# Patient Record
Sex: MALE | Race: BLACK OR AFRICAN AMERICAN | Employment: FULL TIME | URBAN - METROPOLITAN AREA
[De-identification: names, ages, dates, MRNs, and addresses within clinical notes are randomized per-mention and may not be internally consistent; named-entity substitution may affect disease eponyms.]

---

## 2017-09-13 ENCOUNTER — OFFICE VISIT (OUTPATIENT)
Dept: FAMILY MEDICINE CLINIC | Age: 37
End: 2017-09-13

## 2017-09-13 VITALS
RESPIRATION RATE: 18 BRPM | DIASTOLIC BLOOD PRESSURE: 69 MMHG | HEIGHT: 67 IN | WEIGHT: 140 LBS | HEART RATE: 65 BPM | SYSTOLIC BLOOD PRESSURE: 100 MMHG | OXYGEN SATURATION: 98 % | TEMPERATURE: 97.2 F | BODY MASS INDEX: 21.97 KG/M2

## 2017-09-13 DIAGNOSIS — L72.9 SKIN CYST: Primary | ICD-10-CM

## 2017-09-13 RX ORDER — SULFAMETHOXAZOLE AND TRIMETHOPRIM 800; 160 MG/1; MG/1
1 TABLET ORAL 2 TIMES DAILY
Qty: 20 TAB | Refills: 0 | Status: SHIPPED | OUTPATIENT
Start: 2017-09-13 | End: 2017-09-23

## 2017-09-13 NOTE — MR AVS SNAPSHOT
Visit Information Date & Time Provider Department Dept. Phone Encounter #  
 9/13/2017 11:30 AM Rosalino Villanueva [facility] [phone] [id number] Your Appointments 9/13/2017 11:30 AM  
ROUTINE CARE with Rosalino Villanueva, BESSY [facility] ([facility]) Appt Note: boils in different areas on body; vm cut me off; PT R/S FROM 08/31/2017  
 [facility] [address]  
[facility] [address] Upcoming Health Maintenance Date Due Pneumococcal 19-64 Medium Risk (1 of 1 - PPSV23) 10/14/1998 DTaP/Tdap/Td series (1 - Tdap) 10/14/2000 INFLUENZA AGE 9 TO ADULT 8/1/2017 Allergies as of 9/13/2017  Review Complete On: 9/13/2017 By: Ezekiel Marc LPN No Known Allergies Current Immunizations  Never Reviewed No immunizations on file. Not reviewed this visit Vitals BP Pulse Temp Resp Height(growth percentile) Weight(growth percentile) 100/69 (BP 1 Location: Right arm, BP Patient Position: Sitting) 65 97.2 °F (36.2 °C) (Oral) 18 5' 7\" (1.702 m) 140 lb (63.5 kg) SpO2 BMI Smoking Status 98% 21.93 kg/m2 Current Every Day Smoker BMI and BSA Data Body Mass Index Body Surface Area  
 21.93 kg/m 2 1.73 m 2 Your Updated Medication List  
  
Notice  As of 9/13/2017 11:25 AM  
 You have not been prescribed any medications. Introducing [facility]! Shanti Peacock introduces [email or web address] patient portal. Now you can access parts of your medical record, email your doctor's office, and request medication refills online. 1. In your internet browser, go to [email or web address] 2. Click on the First Time User? Click Here link in the Sign In box. You will see the New Member Sign Up page. 3. Enter your [email or web address] Access Code exactly as it appears below.  You will not need to use this code after youve completed the sign-up process. If you do not sign up before the expiration date, you must request a new code. · CareShare Access Code: [email or web address] Expires: 12/12/2017 11:25 AM 
 
4. Enter the last four digits of your Social Security Number (xxxx) and Date of Birth (mm/dd/yyyy) as indicated and click Submit. You will be taken to the next sign-up page. 5. Create a CareShare ID. This will be your CareShare login ID and cannot be changed, so think of one that is secure and easy to remember. 6. Create a CareShare password. You can change your password at any time. 7. Enter your Password Reset Question and Answer. This can be used at a later time if you forget your password. 8. Enter your e-mail address. You will receive e-mail notification when new information is available in [address] 9. Click Sign Up. You can now view and download portions of your medical record. 10. Click the Download Summary menu link to download a portable copy of your medical information. If you have questions, please visit the Frequently Asked Questions section of the CareShare website. Remember, CareShare is NOT to be used for urgent needs. For medical emergencies, dial 911. Now available from your iPhone and Android! Please provide this summary of care documentation to your next provider. Your primary care clinician is listed as Gato Do. If you have any questions after today's visit, please call [phone].

## 2017-09-13 NOTE — PROGRESS NOTES
HISTORY OF PRESENT ILLNESS  Danny Matias is a 39 y.o. male. 9/13/17  11:15 AM    Chief Complaint   Patient presents with    Skin Problem     patient states he have boils on buttocks  and legs  for about 1 mo. he also states the boils come and go. HPI: Here today for complaints of boils. PCP CRIS Damon PA-C. He reports that he gets these intermittent boils on his buttocks, upper thighs, and groin area- this episode occurring for the last month. Has several at this time today. Has not tried anything to help. No recent AB usage. No fevers or chills. Review of Systems   Constitutional: Negative for chills and fever. Skin: Negative for itching and rash.        +boils        PHQ Screening   PHQ over the last two weeks 9/13/2017   Little interest or pleasure in doing things -   Feeling down, depressed or hopeless Not at all   Total Score PHQ 2 -         History  History reviewed. No pertinent past medical history. History reviewed. No pertinent surgical history. Social History     Social History    Marital status: SINGLE     Spouse name: N/A    Number of children: N/A    Years of education: N/A     Occupational History    Not on file. Social History Main Topics    Smoking status: Current Every Day Smoker     Packs/day: 0.25     Years: 6.00    Smokeless tobacco: Current User      Comment: smoke 5-6 a day     Alcohol use 1.2 oz/week     2 Cans of beer per week    Drug use: No    Sexual activity: No     Other Topics Concern    Not on file     Social History Narrative       No Known Allergies      No daily medications      Patient Care Team:  Patient Care Team:  EMILY Welch as PCP - General        LABS:  None new to review    RADIOLOGY:  None new to review      Physical Exam   Constitutional: He is oriented to person, place, and time. He appears well-developed and well-nourished. No distress. Pulmonary/Chest: Effort normal. No respiratory distress.    Neurological: He is alert and oriented to person, place, and time. Skin: Skin is warm and dry.        -several resolving cysts noted to groin and buttocks; numerous areas of scarring to buttocks consistent with resolved cysts; no erythema noted and no areas of definite fluid accumulation        Vitals:    09/13/17 1101   BP: 100/69   Pulse: 65   Resp: 18   Temp: 97.2 °F (36.2 °C)   TempSrc: Oral   SpO2: 98%   Weight: 140 lb (63.5 kg)   Height: 5' 7\" (1.702 m)   PainSc:   5   PainLoc: Abdomen       ASSESSMENT and PLAN  Diagnoses and all orders for this visit:    Skin cyst  *Discussed with patient, he reports frequent cysts this last month. Will do AB therapy at this time. Advised on warm compresses and hygiene. Advised on low caffeine, considering Dial soap for antibacterial properties and avoidance of constricting clothing. *No cysts with palpable areas needing drained- I&D not performed. -     trimethoprim-sulfamethoxazole (BACTRIM DS, SEPTRA DS) 160-800 mg per tablet; Take 1 Tab by mouth two (2) times a day for 10 days. *Plan of care reviewed with patient. Patient in agreement with plan and expresses understanding. All questions answered and patient encouraged to call or RTO if further questions or concerns. Follow-up Disposition:  Return if symptoms worsen or fail to improve.

## 2018-05-21 ENCOUNTER — HOSPITAL ENCOUNTER (EMERGENCY)
Age: 38
Discharge: HOME | End: 2018-05-21
Attending: EMERGENCY MEDICINE
Payer: SELF-PAY

## 2018-05-21 ENCOUNTER — APPOINTMENT (OUTPATIENT)
Dept: GENERAL RADIOLOGY | Age: 38
End: 2018-05-21
Attending: EMERGENCY MEDICINE
Payer: SELF-PAY

## 2018-05-21 VITALS
HEIGHT: 67 IN | RESPIRATION RATE: 15 BRPM | TEMPERATURE: 98.8 F | BODY MASS INDEX: 21.97 KG/M2 | HEART RATE: 70 BPM | SYSTOLIC BLOOD PRESSURE: 135 MMHG | DIASTOLIC BLOOD PRESSURE: 76 MMHG | OXYGEN SATURATION: 100 % | WEIGHT: 140 LBS

## 2018-05-21 DIAGNOSIS — R07.89 OTHER CHEST PAIN: Primary | ICD-10-CM

## 2018-05-21 LAB
ALBUMIN SERPL-MCNC: 3.3 G/DL (ref 3.4–5)
ALBUMIN/GLOB SERPL: 1.1 {RATIO} (ref 0.8–1.7)
ALP SERPL-CCNC: 47 U/L (ref 45–117)
ALT SERPL-CCNC: 31 U/L (ref 16–61)
ANION GAP SERPL CALC-SCNC: 9 MMOL/L (ref 3–18)
AST SERPL-CCNC: 24 U/L (ref 15–37)
BASOPHILS # BLD: 0 K/UL (ref 0–0.1)
BASOPHILS NFR BLD: 0 % (ref 0–2)
BILIRUB DIRECT SERPL-MCNC: 0.2 MG/DL (ref 0–0.2)
BILIRUB SERPL-MCNC: 0.5 MG/DL (ref 0.2–1)
BUN SERPL-MCNC: 4 MG/DL (ref 7–18)
BUN/CREAT SERPL: 4 (ref 12–20)
CALCIUM SERPL-MCNC: 8.6 MG/DL (ref 8.5–10.1)
CHLORIDE SERPL-SCNC: 106 MMOL/L (ref 100–108)
CK MB CFR SERPL CALC: 0.8 % (ref 0–4)
CK MB CFR SERPL CALC: 0.8 % (ref 0–4)
CK MB SERPL-MCNC: 1.1 NG/ML (ref 5–25)
CK MB SERPL-MCNC: 1.4 NG/ML (ref 5–25)
CK SERPL-CCNC: 139 U/L (ref 39–308)
CK SERPL-CCNC: 179 U/L (ref 39–308)
CO2 SERPL-SCNC: 28 MMOL/L (ref 21–32)
CREAT SERPL-MCNC: 0.9 MG/DL (ref 0.6–1.3)
DIFFERENTIAL METHOD BLD: ABNORMAL
EOSINOPHIL # BLD: 0.1 K/UL (ref 0–0.4)
EOSINOPHIL NFR BLD: 1 % (ref 0–5)
ERYTHROCYTE [DISTWIDTH] IN BLOOD BY AUTOMATED COUNT: 13.5 % (ref 11.6–14.5)
GLOBULIN SER CALC-MCNC: 3 G/DL (ref 2–4)
GLUCOSE SERPL-MCNC: 93 MG/DL (ref 74–99)
HCT VFR BLD AUTO: 40.3 % (ref 36–48)
HGB BLD-MCNC: 15.6 G/DL (ref 13–16)
LIPASE SERPL-CCNC: 97 U/L (ref 73–393)
LYMPHOCYTES # BLD: 2.2 K/UL (ref 0.9–3.6)
LYMPHOCYTES NFR BLD: 29 % (ref 21–52)
MCH RBC QN AUTO: 30.2 PG (ref 24–34)
MCHC RBC AUTO-ENTMCNC: 38.1 G/DL (ref 31–37)
MCV RBC AUTO: 77.5 FL (ref 74–97)
MONOCYTES # BLD: 0.5 K/UL (ref 0.05–1.2)
MONOCYTES NFR BLD: 7 % (ref 3–10)
NEUTS SEG # BLD: 4.9 K/UL (ref 1.8–8)
NEUTS SEG NFR BLD: 63 % (ref 40–73)
PLATELET # BLD AUTO: 216 K/UL (ref 135–420)
PMV BLD AUTO: 9.2 FL (ref 9.2–11.8)
POTASSIUM SERPL-SCNC: 3.7 MMOL/L (ref 3.5–5.5)
PROT SERPL-MCNC: 6.3 G/DL (ref 6.4–8.2)
RBC # BLD AUTO: 5.2 M/UL (ref 4.7–5.5)
SODIUM SERPL-SCNC: 143 MMOL/L (ref 136–145)
TROPONIN I SERPL-MCNC: <0.02 NG/ML (ref 0–0.04)
TROPONIN I SERPL-MCNC: <0.02 NG/ML (ref 0–0.04)
WBC # BLD AUTO: 7.7 K/UL (ref 4.6–13.2)

## 2018-05-21 PROCEDURE — 94762 N-INVAS EAR/PLS OXIMTRY CONT: CPT

## 2018-05-21 PROCEDURE — 83690 ASSAY OF LIPASE: CPT | Performed by: EMERGENCY MEDICINE

## 2018-05-21 PROCEDURE — 85025 COMPLETE CBC W/AUTO DIFF WBC: CPT | Performed by: EMERGENCY MEDICINE

## 2018-05-21 PROCEDURE — 71045 X-RAY EXAM CHEST 1 VIEW: CPT

## 2018-05-21 PROCEDURE — 93005 ELECTROCARDIOGRAM TRACING: CPT

## 2018-05-21 PROCEDURE — 82550 ASSAY OF CK (CPK): CPT | Performed by: EMERGENCY MEDICINE

## 2018-05-21 PROCEDURE — 74011250637 HC RX REV CODE- 250/637: Performed by: EMERGENCY MEDICINE

## 2018-05-21 PROCEDURE — 99285 EMERGENCY DEPT VISIT HI MDM: CPT

## 2018-05-21 PROCEDURE — 80076 HEPATIC FUNCTION PANEL: CPT | Performed by: EMERGENCY MEDICINE

## 2018-05-21 PROCEDURE — 80048 BASIC METABOLIC PNL TOTAL CA: CPT | Performed by: EMERGENCY MEDICINE

## 2018-05-21 RX ORDER — GUAIFENESIN 100 MG/5ML
324 LIQUID (ML) ORAL
Status: COMPLETED | OUTPATIENT
Start: 2018-05-21 | End: 2018-05-21

## 2018-05-21 RX ADMIN — ASPIRIN 81 MG 324 MG: 81 TABLET ORAL at 17:24

## 2018-05-21 NOTE — ED PROVIDER NOTES
EMERGENCY DEPARTMENT HISTORY AND PHYSICAL EXAM    4:44 PM      Date: 2018  Patient Name: Nazario Baird    History of Presenting Illness     Chief Complaint   Patient presents with    Chest Pain         History Provided By: Patient    Additional History (Context): Nazario Baird is a 44 y.o. male with No significant past medical history presenting to the ED with c/o constant stabbing CP that began earlier this morning. Reports a family hx of cardiac disease, however denies having any hx of cardiac issue himself. Denies any SOB, diaphoresis, fever, chills, leg swelling, leg pain, abdominal pain, nausea, vomiting, diarrhea, urinary sx, long period of traveling or hx of blood clots. Severity is 10/10. No other sx or complaints given at this time. PCP: EMILY Shaw    Chief Complaint: Chest Pain   Duration: Earlier this morning  Timing:  Constant  Location: Across the chest   Quality: Stabbing  Severity: 10 out of 10  Modifying Factors: None   Associated Symptoms: denies any other associated signs or symptoms      Current Facility-Administered Medications   Medication Dose Route Frequency Provider Last Rate Last Dose    aspirin chewable tablet 324 mg  324 mg Oral NOW EMILY Lucero           Past History     Past Medical History:  History reviewed. No pertinent past medical history. Past Surgical History:  History reviewed. No pertinent surgical history.     Family History:  Family History   Problem Relation Age of Onset    Diabetes Mother     Heart Disease Mother       - MI age 64    Cancer Maternal Uncle        Social History:  Social History   Substance Use Topics    Smoking status: Current Every Day Smoker     Packs/day: 0.25     Years: 6.00    Smokeless tobacco: Current User      Comment: smoke 5-6 a day     Alcohol use 1.2 oz/week     2 Cans of beer per week       Allergies:  No Known Allergies      Review of Systems     Review of Systems   Constitutional: Negative for fever.   Respiratory: Negative for shortness of breath. Cardiovascular: Positive for chest pain. Negative for leg swelling. Gastrointestinal: Negative for abdominal pain, diarrhea, nausea and vomiting. All other systems reviewed and are negative. Physical Exam     Visit Vitals    /82 (BP 1 Location: Left arm, BP Patient Position: At rest)    Pulse 95    Temp 98.8 °F (37.1 °C)    Resp 14    Ht 5' 7\" (1.702 m)    Wt 63.5 kg (140 lb)    SpO2 99%    BMI 21.93 kg/m2       Physical Exam   Constitutional: He is oriented to person, place, and time. He appears well-developed. HENT:   Head: Normocephalic and atraumatic. Eyes: EOM are normal. Pupils are equal, round, and reactive to light. Neck: Normal range of motion. Neck supple. Cardiovascular: Normal rate, regular rhythm and normal heart sounds. Exam reveals no friction rub. No murmur heard. Pulmonary/Chest: Effort normal and breath sounds normal. No respiratory distress. He has no wheezes. Abdominal: Soft. He exhibits no distension. There is no tenderness. There is no rebound and no guarding. Musculoskeletal: Normal range of motion. Neurological: He is alert and oriented to person, place, and time. Skin: Skin is warm and dry. Psychiatric: He has a normal mood and affect. His behavior is normal. Thought content normal.         Diagnostic Study Results   ekg nsr at 89; nl axi.s nl int. No semaj/d. No hypertrophy   cxr napd. Medical Decision Making     1. Cp ; intermitted sharp; n n/v. No radiation. No leg swelling no s/sx of dv.t check trop x 2 if neg d/c  Heart <3; refer to pcp. Diagnosis     No diagnosis found.     _______________________________    Attestations:  Scribe [address] [place] [address] acting as a scribe for and in the presence of Lakeisha Carrizales MD      May 21, 2018 at 4:44 PM       Provider Attestation:      I personally performed the services described in the documentation, reviewed the documentation, as recorded by the scribe in my presence, and it accurately and completely records my words and actions.  May 21, 2018 at 4:44 PM - Teresa Sierra MD    _______________________________

## 2018-05-21 NOTE — LETTER
[address] [place] [address] [facility] EMERGENCY DEPT 
[address] [facility] [address] 
[phone] Work/School Note Date: 5/21/2018 To Whom It May concern: 
 
Ermias Garrett was seen and treated today in the emergency room by the following provider(s): 
Attending Provider: Allie Madrigal MD. Ermias Garrett may return to work on 5/22/18.  
 
Sincerely, 
 
 
 
 
Declan Alarcon RN

## 2018-05-21 NOTE — ED TRIAGE NOTES
The patient presents for evaluation of chest pain that began this morning. He denies nausea, vomiting, shortness of breath, or diaphoresis.

## 2018-05-21 NOTE — ED NOTES
I performed a brief evaluation, including history and physical, of the patient here in triage and I have determined that pt will need further treatment and evaluation from the main side ER physician. I have placed initial orders to help in expediting patients care.      May 21, 2018 at 4:05 PM - EMILY Jennings        Visit Vitals    /82 (BP 1 Location: Left arm, BP Patient Position: At rest)    Pulse 95    Temp 98.8 °F (37.1 °C)    Resp 14    Ht 5' 7\" (1.702 m)    Wt 63.5 kg (140 lb)    SpO2 99%    BMI 21.93 kg/m2

## 2018-05-22 LAB
ATRIAL RATE: 89 BPM
CALCULATED P AXIS, ECG09: 72 DEGREES
CALCULATED R AXIS, ECG10: -38 DEGREES
CALCULATED T AXIS, ECG11: 68 DEGREES
DIAGNOSIS, 93000: NORMAL
P-R INTERVAL, ECG05: 136 MS
Q-T INTERVAL, ECG07: 346 MS
QRS DURATION, ECG06: 92 MS
QTC CALCULATION (BEZET), ECG08: 420 MS
VENTRICULAR RATE, ECG03: 89 BPM

## 2018-05-22 NOTE — DISCHARGE INSTRUCTIONS
Chest Pain: Care Instructions  Your Care Instructions    There are many things that can cause chest pain. Some are not serious and will get better on their own in a few days. But some kinds of chest pain need more testing and treatment. Your doctor may have recommended a follow-up visit in the next 8 to 12 hours. If you are not getting better, you may need more tests or treatment. Even though your doctor has released you, you still need to watch for any problems. The doctor carefully checked you, but sometimes problems can develop later. If you have new symptoms or if your symptoms do not get better, get medical care right away. If you have worse or different chest pain or pressure that lasts more than 5 minutes or you passed out (lost consciousness), call 911 or seek other emergency help right away. A medical visit is only one step in your treatment. Even if you feel better, you still need to do what your doctor recommends, such as going to all suggested follow-up appointments and taking medicines exactly as directed. This will help you recover and help prevent future problems. How can you care for yourself at home? · Rest until you feel better. · Take your medicine exactly as prescribed. Call your doctor if you think you are having a problem with your medicine. · Do not drive after taking a prescription pain medicine. When should you call for help? Call 911 if:  ? · You passed out (lost consciousness). ? · You have severe difficulty breathing. ? · You have symptoms of a heart attack. These may include:  ¨ Chest pain or pressure, or a strange feeling in your chest.  ¨ Sweating. ¨ Shortness of breath. ¨ Nausea or vomiting. ¨ Pain, pressure, or a strange feeling in your back, neck, jaw, or upper belly or in one or both shoulders or arms. ¨ Lightheadedness or sudden weakness. ¨ A fast or irregular heartbeat.   After you call 911, the  may tell you to chew 1 adult-strength or 2 to 4 low-dose aspirin. Wait for an ambulance. Do not try to drive yourself. ?Call your doctor today if:  ? · You have any trouble breathing. ? · Your chest pain gets worse. ? · You are dizzy or lightheaded, or you feel like you may faint. ? · You are not getting better as expected. ? · You are having new or different chest pain. Where can you learn more? Go to http://vanessa-delmar.info/. Enter A120 in the search box to learn more about \"Chest Pain: Care Instructions. \"  Current as of: March 20, 2017  Content Version: 11.4  © 3584-9758 [employer]. Care instructions adapted under license by [employer] (which disclaims liability or warranty for this information). If you have questions about a medical condition or this instruction, always ask your healthcare professional. [address] any warranty or liability for your use of this information.

## 2024-08-24 ENCOUNTER — HOSPITAL ENCOUNTER (EMERGENCY)
Facility: HOSPITAL | Age: 44
Discharge: HOME | End: 2024-08-24
Payer: MEDICAID

## 2024-08-24 ENCOUNTER — APPOINTMENT (OUTPATIENT)
Facility: HOSPITAL | Age: 44
End: 2024-08-24
Payer: MEDICAID

## 2024-08-24 VITALS
RESPIRATION RATE: 18 BRPM | HEART RATE: 75 BPM | WEIGHT: 139 LBS | OXYGEN SATURATION: 100 % | BODY MASS INDEX: 21.82 KG/M2 | TEMPERATURE: 97.9 F | HEIGHT: 67 IN | DIASTOLIC BLOOD PRESSURE: 70 MMHG | SYSTOLIC BLOOD PRESSURE: 121 MMHG

## 2024-08-24 DIAGNOSIS — M79.604 RIGHT LEG PAIN: Primary | ICD-10-CM

## 2024-08-24 LAB — ECHO BSA: 1.73 M2

## 2024-08-24 PROCEDURE — 73590 X-RAY EXAM OF LOWER LEG: CPT

## 2024-08-24 PROCEDURE — 93971 EXTREMITY STUDY: CPT

## 2024-08-24 PROCEDURE — 73552 X-RAY EXAM OF FEMUR 2/>: CPT

## 2024-08-24 PROCEDURE — 99283 EMERGENCY DEPT VISIT LOW MDM: CPT

## 2024-08-24 RX ORDER — NAPROXEN 500 MG/1
500 TABLET ORAL 2 TIMES DAILY
Qty: 14 TABLET | Refills: 0 | Status: SHIPPED | OUTPATIENT
Start: 2024-08-24 | End: 2024-08-31

## 2024-08-24 ASSESSMENT — ENCOUNTER SYMPTOMS
NAUSEA: 0
SHORTNESS OF BREATH: 0
VOMITING: 0
DIARRHEA: 0
ABDOMINAL PAIN: 0

## 2024-08-24 ASSESSMENT — PAIN DESCRIPTION - DESCRIPTORS: DESCRIPTORS: CRAMPING

## 2024-08-24 ASSESSMENT — PAIN SCALES - GENERAL: PAINLEVEL_OUTOF10: 9

## 2024-08-24 ASSESSMENT — PAIN DESCRIPTION - ORIENTATION: ORIENTATION: RIGHT

## 2024-08-24 ASSESSMENT — PAIN - FUNCTIONAL ASSESSMENT
PAIN_FUNCTIONAL_ASSESSMENT: NONE - DENIES PAIN
PAIN_FUNCTIONAL_ASSESSMENT: 0-10

## 2024-08-24 ASSESSMENT — PAIN DESCRIPTION - LOCATION: LOCATION: LEG

## 2024-08-24 ASSESSMENT — PAIN DESCRIPTION - FREQUENCY: FREQUENCY: CONTINUOUS

## 2024-08-24 NOTE — ED TRIAGE NOTES
Chief Complaint   Patient presents with    Leg Pain     Right        Patient came in due to right leg pain (crampy in characteristic). Patient stated he went to [facility] 2-3 days prior symptoms. Patient denies any trauma, chest pain or shortness of breath.

## 2024-08-24 NOTE — ED PROVIDER NOTES
EMERGENCY DEPARTMENT HISTORY AND PHYSICAL EXAM    11:02 AM      Date: 8/24/2024  Patient Name: Ulisses Lyle    History of Presenting Illness     Chief Complaint   Patient presents with    Leg Pain     Right            History Provided By: Patient    Additional History (Context): Ulisses Lyle is a 43 y.o. male with No past medical history on file.} who presents with complaint of diffuse right leg pain x 2 days.  Patient symptoms started after he was at a trampoline park with his daughter.  Patient's pain is worse with movement and palpation.  Patient is he is tried over-the-counter medication for symptoms without relief.  Patient declines pain medication in the ED.  Patient denies fall or trauma, numbness tingling, swelling or discoloration.  Denies history of DVT or PE, hemoptysis, recent surgery or travel.    PCP: Sandhya Espinal PA    No current facility-administered medications for this encounter.     Current Outpatient Medications   Medication Sig Dispense Refill    naproxen (NAPROSYN) 500 MG tablet Take 1 tablet by mouth 2 times daily for 7 days 14 tablet 0       Past History     Past Medical History:  No past medical history on file.    Past Surgical History:  No past surgical history on file.    Family History:  No family history on file.    Social History:       Allergies:  No Known Allergies      Review of Systems       Review of Systems   Constitutional:  Negative for chills and fever.   Respiratory:  Negative for shortness of breath.    Cardiovascular:  Negative for chest pain.   Gastrointestinal:  Negative for abdominal pain, diarrhea, nausea and vomiting.   Musculoskeletal:  Positive for arthralgias and myalgias.   Skin:  Negative for rash.   All other systems reviewed and are negative.        Physical Exam   /70   Pulse 75   Temp 97.9 °F (36.6 °C) (Oral)   Resp 18   Ht 1.702 m (5' 7\")   Wt 63 kg (139 lb)   SpO2 100%   BMI 21.77 kg/m²       Physical Exam  Vitals and nursing

## 2024-08-24 NOTE — DISCHARGE INSTRUCTIONS
Take medication as prescribed. Follow-up with your primary care physician within 2 days for reassessment. Bring the results from this visit with you for their review. Return to the ED immediately for any new, worsening, or persistent symptoms, including weakness, numbness, or any other medical concerns.

## 2024-08-27 LAB — ECHO BSA: 1.73 M2

## 2024-08-27 PROCEDURE — 93971 EXTREMITY STUDY: CPT | Performed by: SURGERY
